# Patient Record
Sex: FEMALE | Race: WHITE | NOT HISPANIC OR LATINO | URBAN - METROPOLITAN AREA
[De-identification: names, ages, dates, MRNs, and addresses within clinical notes are randomized per-mention and may not be internally consistent; named-entity substitution may affect disease eponyms.]

---

## 2024-11-10 ENCOUNTER — EMERGENCY (EMERGENCY)
Facility: HOSPITAL | Age: 29
LOS: 1 days | Discharge: HOME | End: 2024-11-10
Admitting: EMERGENCY MEDICINE
Payer: COMMERCIAL

## 2024-11-10 VITALS
HEART RATE: 100 BPM | SYSTOLIC BLOOD PRESSURE: 133 MMHG | HEIGHT: 63 IN | RESPIRATION RATE: 18 BRPM | DIASTOLIC BLOOD PRESSURE: 88 MMHG | TEMPERATURE: 98 F | WEIGHT: 154.98 LBS | OXYGEN SATURATION: 98 %

## 2024-11-10 VITALS
SYSTOLIC BLOOD PRESSURE: 95 MMHG | RESPIRATION RATE: 17 BRPM | OXYGEN SATURATION: 99 % | DIASTOLIC BLOOD PRESSURE: 64 MMHG | TEMPERATURE: 97 F | HEART RATE: 86 BPM

## 2024-11-10 PROCEDURE — 99284 EMERGENCY DEPT VISIT MOD MDM: CPT

## 2024-11-10 RX ORDER — ONDANSETRON HYDROCHLORIDE 2 MG/ML
4 INJECTION, SOLUTION INTRAMUSCULAR; INTRAVENOUS ONCE
Refills: 0 | Status: COMPLETED | OUTPATIENT
Start: 2024-11-10 | End: 2024-11-10

## 2024-11-10 RX ADMIN — ONDANSETRON HYDROCHLORIDE 4 MILLIGRAM(S): 2 INJECTION, SOLUTION INTRAMUSCULAR; INTRAVENOUS at 22:00

## 2024-11-10 NOTE — ED ADULT TRIAGE NOTE - CHIEF COMPLAINT QUOTE
[facility] [employer]. Pt admits to having x6 alcoholic drinks tonight, denies any drug use. Pt c/o nausea and had x1 episode of vomiting PTA. No injuries reported or observed.

## 2024-11-10 NOTE — ED PROVIDER NOTE - OBJECTIVE STATEMENT
27 y/o F with no reported pmhx presents to the ED for alcohol intoxication. Pt states she drank about 5-6 drinks tonight. Shortly after pt started to feel nauseous, vomited once prior to arrival to the ED. Denies recent fall/trauma, HA, LOC, nausea at this time, abd pain, CP, SOB, pain in any extremity. Denies any other drug use. NKDA. No other complaints at this time.
Passed

## 2024-11-10 NOTE — ED PROVIDER NOTE - PATIENT PORTAL LINK FT
You can access the FollowMyHealth Patient Portal offered by [facility] by registering at the following website: [email or web address]. By joining [facility]’s FollowMyHealth portal, you will also be able to view your health information using other applications (apps) compatible with our system.

## 2024-11-10 NOTE — ED PROVIDER NOTE - NSFOLLOWUPINSTRUCTIONS_ED_ALL_ED_FT
Alcohol Intoxication  WHAT YOU NEED TO KNOW:  Alcohol intoxication is a harmful physical condition caused when you drink more alcohol than your body can handle. It is also called ethanol poisoning, or being drunk.  DISCHARGE INSTRUCTIONS:  Call your local emergency number (911 in the ) if:   •You have sudden trouble breathing or chest pain.  •You have a seizure.  •You feel sad enough to harm yourself or others.  Call your doctor if:   •You have hallucinations (you see or hear things that are not real).  •You cannot stop vomiting.  •You have questions or concerns about your condition or care.  Recommended alcohol limits:   •Men 21 to 64 years should limit alcohol to 2 drinks a day. Do not have more than 4 drinks in 1 day or more than 14 in 1 week.  •All women, and men 65 or older should limit alcohol to 1 drink in a day. Do not have more than 3 drinks in 1 day or more than 7 in 1 week. No amount of alcohol is okay during pregnancy.  Manage alcohol use:   •Decrease the amount you drink. This can help prevent health problems such as brain, heart, and liver damage, high blood pressure, diabetes, and cancer. If you cannot stop completely, healthcare providers can help you set goals to decrease the amount you drink.  •Plan weekly alcohol use. You will be less likely to drink more than the recommended limit if you plan ahead.  •Have food when you drink alcohol. Food will prevent alcohol from getting into your system too quickly. Eat before you have your first alcohol drink.  •Time your drinks carefully. Have no more than 1 drink in an hour. Have a liquid such as water, coffee, or a soft drink between alcohol drinks.  •Do not drive if you have had alcohol. Make sure someone who has not been drinking can help you get home.  •Do not drink alcohol if you are taking medicine. Alcohol is dangerous when you combine it with certain medicines, such as acetaminophen or blood pressure medicine. Talk to your healthcare provider about all the medicines you currently take.  For more information:   •Alcoholics Anonymous  Web Address: http://www.aa.org  •Substance Abuse and Mental Health Services Administration  [address]  [place] [address]  Web Address: http://www.[facility].gov  Follow up with your healthcare provider as directed: Write down your questions so you remember to ask them during your visits.

## 2024-11-10 NOTE — ED PROVIDER NOTE - CLINICAL SUMMARY MEDICAL DECISION MAKING FREE TEXT BOX
27 y/o F with no reported pmhx presents to the ED for alcohol intoxication. Pt states she drank about 5-6 drinks tonight. Shortly after pt started to feel nauseous, vomited once prior to arrival to the ED. Denies recent fall/trauma, HA, LOC, nausea at this time, abd pain, CP, SOB, pain in any extremity. Denies any other drug use. NKDA. No other complaints at this time.    Patient currently afebrile, hemodynamically stable, spO2 100%. Based on history and physical, dx most consistent with alcohol intoxication. Pt awake and alert, oriented to person, place and time. Speaking in clear sentences. Will observe for any additional episodes of nausea or vomiting. No visible injuries noted. No tenderness to palpation over all four extremities. No signs of head injury or trauma - no focal neuro deficits.

## 2024-11-10 NOTE — ED PROVIDER NOTE - PHYSICAL EXAMINATION
JEY General: Well appearing in no acute distress, alert and cooperative. Clothes noted to be covered in dirt on LLE. Some dirt on face.  Head: Normocephalic, atraumatic. No palpable scalp hematomas or areas of scalp tenderness.  Eyes: PERRLA, no conjunctival injection, no scleral icterus, EOMI  ENMT: Atraumatic external nose and ears, moist mucous membranes, oropharynx clear. No tenderness to palpation over face.  Neck: Soft and supple, full ROM without pain, no midline tenderness  Cardiac: Regular rate and regular rhythm, no murmurs  Resp: Unlabored respiratory effort, lungs CTAB, speaking in full sentences, no wheezes  Abd: Soft, non-tender, non-distended, no guarding or rebound tenderness  MSK: Spine midline and non-tender  Skin: Warm and dry, no rashes/abrasions/lacerations.   Neuro: AO x 3, moves all extremities symmetrically, Motor strength 5/5 bilaterally UE and LE, sensation grossly intact

## 2024-11-10 NOTE — ED PROVIDER NOTE - PROGRESS NOTE DETAILS
Ambulating with steady gait. Pt talking on the phone with her friends. Speech is clear and fluent. A&Ox4. Tolerating PO intake. Will d/c.

## 2024-11-14 DIAGNOSIS — R11.2 NAUSEA WITH VOMITING, UNSPECIFIED: ICD-10-CM

## 2024-11-14 DIAGNOSIS — F10.129 ALCOHOL ABUSE WITH INTOXICATION, UNSPECIFIED: ICD-10-CM
